# Patient Record
Sex: FEMALE | URBAN - METROPOLITAN AREA
[De-identification: names, ages, dates, MRNs, and addresses within clinical notes are randomized per-mention and may not be internally consistent; named-entity substitution may affect disease eponyms.]

---

## 2021-07-09 ENCOUNTER — OFFICE VISIT (OUTPATIENT)
Dept: URBAN - METROPOLITAN AREA CLINIC 43 | Facility: CLINIC | Age: 41
End: 2021-07-09
Payer: OTHER GOVERNMENT

## 2021-07-09 DIAGNOSIS — H52.13 MYOPIA, BILATERAL: ICD-10-CM

## 2021-07-09 DIAGNOSIS — H17.9 CORNEAL SCAR: Primary | ICD-10-CM

## 2021-07-09 PROCEDURE — 99204 OFFICE O/P NEW MOD 45 MIN: CPT | Performed by: OPTOMETRIST

## 2021-07-09 ASSESSMENT — KERATOMETRY
OS: 43.13
OD: 43.75

## 2021-07-09 ASSESSMENT — VISUAL ACUITY
OS: 20/30
OD: 20/20

## 2021-07-09 ASSESSMENT — INTRAOCULAR PRESSURE
OD: 13
OS: 13

## 2021-07-09 NOTE — IMPRESSION/PLAN
Impression: Corneal scar: H17.9. 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   Pt taking prophylactic dose of valtrex, return if symptoms recur